# Patient Record
Sex: MALE | Race: BLACK OR AFRICAN AMERICAN | Employment: UNEMPLOYED | URBAN - METROPOLITAN AREA
[De-identification: names, ages, dates, MRNs, and addresses within clinical notes are randomized per-mention and may not be internally consistent; named-entity substitution may affect disease eponyms.]

---

## 2017-04-05 ENCOUNTER — APPOINTMENT (OUTPATIENT)
Dept: CT IMAGING | Facility: HOSPITAL | Age: 37
End: 2017-04-05
Attending: EMERGENCY MEDICINE
Payer: MEDICAID

## 2017-04-05 ENCOUNTER — APPOINTMENT (OUTPATIENT)
Dept: GENERAL RADIOLOGY | Facility: HOSPITAL | Age: 37
End: 2017-04-05
Payer: MEDICAID

## 2017-04-05 ENCOUNTER — APPOINTMENT (OUTPATIENT)
Dept: GENERAL RADIOLOGY | Facility: HOSPITAL | Age: 37
End: 2017-04-05
Attending: EMERGENCY MEDICINE
Payer: MEDICAID

## 2017-04-05 PROBLEM — F22 DELUSIONAL DISORDER(297.1): Status: ACTIVE | Noted: 2017-04-05

## 2017-04-05 PROCEDURE — 71010 XR CHEST AP PORTABLE  (CPT=71010): CPT

## 2017-04-05 PROCEDURE — 72125 CT NECK SPINE W/O DYE: CPT

## 2017-04-05 PROCEDURE — 70486 CT MAXILLOFACIAL W/O DYE: CPT

## 2017-04-05 PROCEDURE — 73030 X-RAY EXAM OF SHOULDER: CPT

## 2017-04-05 PROCEDURE — 70450 CT HEAD/BRAIN W/O DYE: CPT
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
## 2017-04-06 PROBLEM — F20.89 OTHER SCHIZOPHRENIA (HCC): Status: ACTIVE | Noted: 2017-04-06

## 2017-04-06 PROBLEM — R77.8 ELEVATED TROPONIN: Status: ACTIVE | Noted: 2017-04-06
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
## 2017-04-06 NOTE — PLAN OF CARE
Will report anxiety at manageable levels Progressing      Pt/Family maintain appropriate behavior and adhere to behavioral management agreement, if implemented Progressing      Pt/Family able to verbalize concerns and demonstrate effective coping strategie

## 2017-04-06 NOTE — PLAN OF CARE
Problem: Patient/Family Goals  Goal: Patient/Family Long Term Goal  Patient’s Long Term Goal: go home.     Interventions:  - sitter  - See additional Care Plan goals for specific interventions   Outcome: Progressing  Goal: Patient/Family Short Term Goal  Pa Implement a Health Care Agreement if patient meets criteria  - If a patient’s behavior jeopardizes the safety of the patient, staff, or others refer to organization policy. If a visitor’s behavior poses a threat to safety call refer to organization policy.
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
## 2017-04-06 NOTE — PLAN OF CARE
ANXIETY    • Will report anxiety at manageable levels Progressing        BEHAVIOR    • Pt/Family maintain appropriate behavior and adhere to behavioral management agreement, if implemented Progressing        COPING    • Pt/Family able to verbalize concerns

## 2017-04-06 NOTE — H&P
[address] Patient Status:  Emergency    1980 MRN [id number]   Location [address] Attending Valdez Lara MD   Hosp Day # 0 PCP None Pcp     Date:  2017  Date symmetrical chest wall expansion. Cardiovascular:  Normal rate, regular rhythm, no murmur, no edema. Gastrointestinal:  Soft, non-tender, non-distended, normal bowel sounds, no organomegaly. Lymphatics:  No lymphadenopathy neck, axilla, groin.   Musculos

## 2017-04-07 NOTE — CM/SW NOTE
Requested to contact [facility] for inpatient psychiatric bed by . I spoke with intake at [facility], Sheree, faxed over medical record along with petition and certificate. [facility] returned call and stated no bed availability. Ronnell Reyna RN o
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
## 2017-04-07 NOTE — PROGRESS NOTES
[facility]    Progress Note    Mike Richardson Patient Status:  Observation    1980 MRN [id number]   Location [facility]5W Attending Luigi Rain MD   Hosp Day # 2 PCP No primary care provider on file.        Subjective:   Co hospitalizations because of the mental problems, recently worsened, especially after the death of his mother in November, she and family were not aware of him already being in [place]    Prophylaxis  Subcutaneous heparin    CODE STATUS  Full    Primary ca -------------------------- Sinus Rhythm -Combined atrial enlargement.  -Nonspecific ST depression -Nondiagnostic.  ABNORMAL No previous ECGs available Electronically signed on 04/05/2017 at 18:35 by Louisa Cordon MD  4/7/2017

## 2017-04-07 NOTE — CONSULTS
[facility]    Report of Consultation    Augustina Robbymary Patient Status:  Observation    1980 MRN [id number]   Location [facility]5W Attending Anupam Aguayo MD   Hosp Day # 2 PCP No primary care provider on file.      Date of Current Medications:    Current Facility-Administered Medications:  ketorolac tromethamine (TORADOL) 30 MG/ML injection 30 mg 30 mg Intravenous Q6H PRN   haloperidol lactate (HALDOL) 5 MG/ML injection 2 mg 2 mg Intramuscular Q6H PRN   LORazepam (ATIVAN has been exhibiting alternation in his mood and cognition. He has been exhibiting impairment in his insight and ability to care for self. He denied any homicidal or suicidal ideation. He appear response to internal stimuli by delusional ideation.   Cogni

## 2017-04-07 NOTE — DISCHARGE PLANNING
SESAR following up on d/c planning for the patient. SESAR spoke with Dr. Patricia Dominguez and he is medically stable for d/c today. Prior referral made to [facility] and SESAR left them a message re the transfer.     SESAR called the patient's aunt and she
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
## 2017-04-08 NOTE — DISCHARGE PLANNING
SESAR following up on d/c planning for the patient. SESAR called [facility] to reinitiate the referral.  They have the clinicals and will review them and follow-up with this writer.      9:30am: [facility] has denied the patient d/t patient n

## 2017-04-08 NOTE — PLAN OF CARE
Problem: BEHAVIOR  Goal: Pt/Family maintain appropriate behavior and adhere to behavioral management agreement, if implemented  INTERVENTIONS:  - Assess patient/family’s coping skills and non-compliant behavior (including use of illegal substances)  - Noti

## 2017-04-08 NOTE — PLAN OF CARE
Problem: Patient/Family Goals  Goal: Patient/Family Long Term Goal  Patient’s Long Term Goal: Neck pain to go away completely    Interventions: Medicate with flexeril and pain med as needed, positioning for comfort  - See additional Care Plan goals for spe safety of patient, staff and others  - Encourage participation in the decision making process about the behavioral management agreement  - Implement a Health Care Agreement if patient meets criteria  - If a patient’s behavior jeopardizes the safety of the

## 2017-04-08 NOTE — PROGRESS NOTES
[facility]    Progress Note    Cozetta Jony Patient Status:  Observation    1980 MRN [id number]   Location [facility]5W Attending Roxann Zaragoza MD   Hosp Day # 3 PCP No primary care provider on file.        Subjective:   Co 04/05/2017   HCT 45.5 04/05/2017    04/05/2017   CREATSERUM 1.12 04/05/2017   BUN 6* 04/05/2017    04/05/2017   K 4.0 04/08/2017    04/05/2017   CO2 29 04/05/2017   GLU 84 04/05/2017   CA 9.8 04/05/2017   TROP 0.03 04/05/2017   ETOH 2 04

## 2017-04-09 NOTE — PLAN OF CARE
Problem: SKIN/TISSUE INTEGRITY - ADULT  Goal: Skin integrity remains intact  INTERVENTIONS  - Assess and document risk factors for pressure ulcer development  - Assess and document skin integrity  - Monitor for areas of redness and/or skin breakdown  Outco

## 2017-04-09 NOTE — PROGRESS NOTES
[facility]    Progress Note    Jennifer Joshi Patient Status:  Observation    1980 MRN [id number]   Location [facility]5W Attending Lacey Hinkle MD   Hosp Day # 4 PCP No primary care provider on file.        Subjective:   Co 04/05/2017   HCT 45.5 04/05/2017    04/05/2017   CREATSERUM 1.12 04/05/2017   BUN 6* 04/05/2017    04/05/2017   K 4.0 04/08/2017    04/05/2017   CO2 29 04/05/2017   GLU 84 04/05/2017   CA 9.8 04/05/2017   TROP 0.03 04/05/2017   ETOH 2 04

## 2017-04-09 NOTE — PLAN OF CARE
Problem: Patient/Family Goals  Goal: Patient/Family Long Term Goal  Patient’s Long Term Goal: shoulder pain to go away completely    Interventions: Medicate with flexeril and pain med as needed, positioning for comfort  - See additional Care Plan goals for consistent limit setting strategies supporting safety of patient, staff and others  - Encourage participation in the decision making process about the behavioral management agreement  - Implement a Health Care Agreement if patient meets criteria  - If a pa

## 2017-04-09 NOTE — PLAN OF CARE
Problem: ANXIETY  Goal: Will report anxiety at manageable levels  INTERVENTIONS:  - Administer medication as ordered  - Teach and rehearse alternative coping skills  - Provide emotional support with 1:1 interaction with staff   Outcome: Oswaldo Marie

## 2017-04-09 NOTE — DISCHARGE PLANNING
SESAR following up on d/c planning for the patient. Per report, he is medically stable for d/c to inpatient psych. SESAR called Melody Clark and a referral was initiated. Patient was previously denied by [facility] and [facility].     1pm: Msg left for Melody Clark

## 2017-04-10 NOTE — PLAN OF CARE
Problem: Patient/Family Goals  Goal: Patient/Family Long Term Goal  Patient’s Long Term Goal: shoulder pain to go away completely    Interventions: Medicate with flexeril and pain med as needed, positioning for comfort  - See additional Care Plan goals for Utilize positive, consistent limit setting strategies supporting safety of patient, staff and others  - Encourage participation in the decision making process about the behavioral management agreement  - Implement a Health Care Agreement if patient meets c choose  - Honor patient and family perspectives and choices   Outcome: Adequate for Discharge  Plan of care updated with patient    Problem: SKIN/TISSUE INTEGRITY - ADULT  Goal: Skin integrity remains intact  INTERVENTIONS  - Assess and document risk facto

## 2017-04-10 NOTE — DISCHARGE SUMMARY
[facility] HOSPITALIST  DISCHARGE SUMMARY     [facility] Patient Status:  Observation    1980 MRN [id number]   Location [facility]5W Attending Liu Hamilton MD   Hosp Day # 5 PCP No primary care provider on file.      Date of Admission:  further treatment, due to insurance approval delay patient stayed here over a weekend and slowly started improving, according to psychiatrist assessment today, patient is stable to be discharged home with family with appropriate resources,  as sounds. No rebound or guarding. Neurologic: No focal neurological deficits. Musculoskeletal: Moves all extremities. Extremities: No edema.   -----------------------------------------------------------------------------------------------  PATIENT DISCHAR

## 2017-04-10 NOTE — DISCHARGE PLANNING
SESAR following up on [facility] referral 4/9 - SESAR left voicemail for Clare. SESAR requested updated PnC. Referrals previously placed to [facility] and [facility] - unable to accept.     SESAR placed referrals to the following -   [facility] - no appropriate beds

## 2017-04-10 NOTE — PLAN OF CARE
Problem: INVOLUNTARY ADMIT  Goal: Will cooperate with staff recommendations and doctor’s orders and will demonstrate appropriate behavior  INTERVENTIONS:  - Treat underlying conditions and offer medication as ordered  - Educate regarding involuntary admiss

## 2017-04-10 NOTE — PLAN OF CARE
Problem: Patient/Family Goals  Goal: Patient/Family Long Term Goal  Patient’s Long Term Goal: shoulder pain to go away completely    Interventions: Medicate with flexeril and pain med as needed, positioning for comfort  - See additional Care Plan goals for patient, staff and others  - Encourage participation in the decision making process about the behavioral management agreement  - Implement a [place] Agreement if patient meets criteria  - If a patient’s behavior jeopardizes the safety of the patient, s
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
## 2017-04-11 NOTE — PLAN OF CARE
Focus: discharge  Data: discharge papers and instructions given and discussed with patient's sister (brayden). Action: instructed sister to call dr. Fermín Hernandez office for appt. Response: sister understood the above instructions.

## (undated) NOTE — IP AVS SNAPSHOT
[address]  [address] [facility] [phone]                Discharge Summary   4/5/2017    [facility]           Admission Information        Provider Department    4/5/2017 Fidencio Moon MD [facility] 5w RISPER[employer]ONE TABLETS (ENGLISH)      Follow-up Information     Follow up with Des Weaver MD. Schedule an appointment as soon as possible for a visit in 1 week.     Specialty:  Psychiatry    Why:  or with your own psychiatrist per choice    Contact inf - If you have concerns related to behavioral health issues or thoughts of harming yourself, contact [address] [place] [address] at [phone].     - If you don’t have insurance, Jackeline Sandra has partnered with Patient Elias-[facility] Solis Sylvia call your provider or healthcare team if you have any questions regarding your medications while at home.          Non-Narcotic Pain Medications     Acetaminophen (TYLENOL) 325 MG Oral Cap       Use: Treat pain, fever, inflammation   Most common side effect